# Patient Record
(demographics unavailable — no encounter records)

---

## 2024-10-16 NOTE — ASSESSMENT
[Importance of Diet and Exercise] : importance of diet and exercise to improve glycemic control, achieve weight loss and improve cardiovascular health [Action and use of Insulin] : action and use of short and long-acting insulin [Insulin Self-Administration] : insulin self-administration [FreeTextEntry1] : Diabetes - control is improving, but not at goal. Instructed to register insulin and carbs in Dexcom Pump differences discussed - pt will think if she'd like Omnipod or Mobi. While thinking about which pomp to choose - will change I/C to 1/10 or increase prandial insulin by 2-3 units. Consider GLP-1 - will discuss at the next visit. F/U in 1-2 over the phone to discuss CGM.

## 2024-12-24 NOTE — ASSESSMENT
[Ketone Testing] : ketone testing [Insulin Self-Administration] : insulin self-administration [FreeTextEntry1] : Diabetes - needs new infusion set - True steel; Infusion sites discussed in great detail. DKA - checking and prevention reinforced. Correcting BG with MDI if pump is not delivering insulin explained and reinforced; Rx for ketostix sent. F/U weekly or more often while adjusting to the pump.

## 2025-01-28 NOTE — REASON FOR VISIT
[Follow - Up] : a follow-up visit [Weight Management/Obesity] : weight management/obesity [Other___] : [unfilled] [Insulin Pump] : insulin pump management

## 2025-02-04 NOTE — ASSESSMENT
[Hypoglycemia Management] : hypoglycemia management [Action and use of Insulin] : action and use of short and long-acting insulin [Self Monitoring of Blood Glucose] : self monitoring of blood glucose [FreeTextEntry1] : Diabetes - control is improving - labs ordered to re-check A1C; pt would do it locally. Adjusting carbs for meal bolus - recommendations provided. Would benefit from GLP1/GIP. Will request PA for Mounjaro. Refills and new Rxs sent. F/U in 2-3 mo.

## 2025-02-04 NOTE — ADDENDUM
[FreeTextEntry1] :  [facility] 1/28  4:03PM accepted order for dexcom successfully. #[id number] via Danyel GUALLPA

## 2025-02-04 NOTE — HISTORY OF PRESENT ILLNESS
[FreeTextEntry1] : A 72 yo  came to establish care and for diabetes management Was diagnosed with (DIANN?) about 10 years ago - was treated with oral meds first. Currently on Tresiba - 18 units q am, and Novolog - 2-5 Units. Uses Dexcom. Needs to install Clarity to get connected. [place] about pumps, but is not sure if she wants one. Needs to "learn more about it". POC A1C - 8.4% POC BG - 296 mg/dl Optho - UTD; Podiatry - UTD eats healthy and exercises regularly; PMH - hypothyroidism - 125 mcg; states had US done and would send us a copy of the report. Hyperlipidemia - on low dose of statins. Osteoporosis - no Tx;  on low dose of daily vit d; Last Dexa 4-5 years ago.  10/16/24 MK  This visit was provided via telehealth using real-time 2-way audio visual technology. The patient was located at home at the time of the visit. The provider, BRENDA GUALLPA, was located at the medical office located in [place], [place] at the time of the visit. The patient and Provider participated in the telehealth encounter. Verbal consent given by the patient. F/U after adjustment of insulin. Pt connected via Clarity - reports downloaded and discussed in great detail. Improvement overnight since moving Tresiba to HS (currently on 16 Units) Postprandial hyperglycemia - Novolog is 3-5 Units ac. Is almost ready for pump - has a few questions. Started counting carbs.  12/24/24 MK  This visit was provided via telehealth using real-time 2-way audio visual technology. The patient was located at home at the time of the visit. The provider, BRENDA GUALLPA, was located at the medical office located in [place], [place] at the time of the visit. The patient and Provider participated in the telehealth encounter. Verbal consent given by the patient.  F/U after initiation of Mobi - was trained last week, and the first 3 days BG was WNR. After changing the set - BG is high - reports downloaded and analyzed via Tandem source. Her Basal rate was increased to 0.9/h; BG target - 110; i/c - 1/10; CF - 1;50, IOB - 5 h. TIR -54%; BG ~ 191 mg/dl  1/28/25 MK  This visit was provided via telehealth using real-time 2-way audio visual technology. The patient was located at home at the time of the visit. The provider, BRENDA GUALLPA, was located at the medical office located in [place], [place] at the time of the visit. The patient and Provider participated in the telehealth encounter. Verbal consent given by the patient.  F/U on DM and pump management. Pt feels well, CGM and pump reports reviewed - TIR - 79% with 1% lows and 3% very high. BG average - 149 mg/dl. She increased basal from 9 am to 9 pm to 1.0; needs more insulin for meal boluses. States CGM wakes her up a few times a week for hypoglycemia. Has problems with CGM; changed insurance - needs new refills to various pharmacies. Hypothyroidism - needs refills for t4. Gained weight

## 2025-02-04 NOTE — HISTORY OF PRESENT ILLNESS
[FreeTextEntry1] : A 72 yo  came to establish care and for diabetes management Was diagnosed with (DIANN?) about 10 years ago - was treated with oral meds first. Currently on Tresiba - 18 units q am, and Novolog - 2-5 Units. Uses Dexcom. Needs to install Clarity to get connected. [place] about pumps, but is not sure if she wants one. Needs to "learn more about it". POC A1C - 8.4% POC BG - 296 mg/dl Optho - UTD; Podiatry - UTD eats healthy and exercises regularly; PMH - hypothyroidism - 125 mcg; states had US done and would send us a copy of the report. Hyperlipidemia - on low dose of statins. Osteoporosis - no Tx;  on low dose of daily vit d; Last Dexa 4-5 years ago.  10/16/24 MK  This visit was provided via telehealth using real-time 2-way audio visual technology. The patient was located at home at the time of the visit. The provider, BRENDA GUALLPA, was located at the medical office located in [place], [place] at the time of the visit. The patient and Provider participated in the telehealth encounter. Verbal consent given by the patient. F/U after adjustment of insulin. Pt connected via Clarity - reports downloaded and discussed in great detail. Improvement overnight since moving Tresiba to HS (currently on 16 Units) Postprandial hyperglycemia - Novolog is 3-5 Units ac. Is almost ready for pump - has a few questions. Started counting carbs.  12/24/24 MK  This visit was provided via telehealth using real-time 2-way audio visual technology. The patient was located at home at the time of the visit. The provider, BRENDA GUALLPA, was located at the medical office located in [place], [place] at the time of the visit. The patient and Provider participated in the telehealth encounter. Verbal consent given by the patient.  F/U after initiation of Mobi - was trained last week, and the first 3 days BG was WNR. After changing the set - BG is high - reports downloaded and analyzed via Tandem source. Her Basal rate was increased to 0.9/h; BG target - 110; i/c - 1/10; CF - 1;50, IOB - 5 h. TIR -54%; BG ~ 191 mg/dl  1/28/25 MK  This visit was provided via telehealth using real-time 2-way audio visual technology. The patient was located at home at the time of the visit. The provider, BRENDA GUALLPA, was located at the medical office located in [place], [place] at the time of the visit. The patient and Provider participated in the telehealth encounter. Verbal consent given by the patient.  F/U on DM and pump management. Pt feels well, CGM and pump reports reviewed - TIR - 79% with 1% lows and 3% very high. BG average - 149 mg/dl. She increased basal from 9 am to 9 pm to 1.0; needs more insulin for meal boluses. States CGM wakes her up a few times a week for hypoglycemia. Has problems with CGM; changed insurance - needs new refills to various pharmacies. Hypothyroidism - needs refills for t4. Gained weight

## 2025-02-04 NOTE — ADDENDUM
[FreeTextEntry1] :  [facility] 1/28  4:03PM accepted order for dexcom successfully. #[id number] via Danyel GUALLPA

## 2025-03-31 NOTE — REASON FOR VISIT
[Follow - Up] : a follow-up visit [DM Type 1] : DM Type 1 [Hypothyroidism] : hypothyroidism [Insulin Pump] : insulin pump management

## 2025-03-31 NOTE — HISTORY OF PRESENT ILLNESS
[FreeTextEntry1] : A 74 yo  came to establish care and for diabetes management Was diagnosed with (DIANN?) about 10 years ago - was treated with oral meds first. Currently on Tresiba - 18 units q am, and Novolog - 2-5 Units. Uses Dexcom. Needs to install Clarity to get connected. [place] about pumps, but is not sure if she wants one. Needs to "learn more about it". POC A1C - 8.4% POC BG - 296 mg/dl Optho - UTD; Podiatry - UTD eats healthy and exercises regularly; PMH - hypothyroidism - 125 mcg; states had US done and would send us a copy of the report. Hyperlipidemia - on low dose of statins. Osteoporosis - no Tx;  on low dose of daily vit d; Last Dexa 4-5 years ago.  10/16/24 MK  This visit was provided via telehealth using real-time 2-way audio visual technology. The patient was located at home at the time of the visit. The provider, BRENDA GUALLPA, was located at the medical office located in [place], [place] at the time of the visit. The patient and Provider participated in the telehealth encounter. Verbal consent given by the patient. F/U after adjustment of insulin. Pt connected via Clarity - reports downloaded and discussed in great detail. Improvement overnight since moving Tresiba to HS (currently on 16 Units) Postprandial hyperglycemia - Novolog is 3-5 Units ac. Is almost ready for pump - has a few questions. Started counting carbs.  12/24/24 MK  This visit was provided via telehealth using real-time 2-way audio visual technology. The patient was located at home at the time of the visit. The provider, BRENDA GUALLPA, was located at the medical office located in [place], [place] at the time of the visit. The patient and Provider participated in the telehealth encounter. Verbal consent given by the patient.  F/U after initiation of Mobi - was trained last week, and the first 3 days BG was WNR. After changing the set - BG is high - reports downloaded and analyzed via Tandem source. Her Basal rate was increased to 0.9/h; BG target - 110; i/c - 1/10; CF - 1;50, IOB - 5 h. TIR -54%; BG ~ 191 mg/dl  1/28/25 MK  This visit was provided via telehealth using real-time 2-way audio visual technology. The patient was located at home at the time of the visit. The provider, BRENDA GUALLPA, was located at the medical office located in [place], [place] at the time of the visit. The patient and Provider participated in the telehealth encounter. Verbal consent given by the patient.  F/U on DM and pump management. Pt feels well, CGM and pump reports reviewed - TIR - 79% with 1% lows and 3% very high. BG average - 149 mg/dl. She increased basal from 9 am to 9 pm to 1.0; needs more insulin for meal boluses. States CGM wakes her up a few times a week for hypoglycemia. Has problems with CGM; changed insurance - needs new refills to various pharmacies. Hypothyroidism - needs refills for t4. Gained weight  3/31/25 MK  This visit was provided via telehealth using real-time 2-way audio visual technology. The patient was located at home at the time of the visit. The provider, BRENDA GUALLPA, was located at the medical office located in [place], [place] at the time of the visit. The patient and Provider participated in the telehealth encounter. Verbal consent given by the patient.  F/U on DM. Feels well. Uses MOBI pump - reports downloaded and reviewed with the pt. Pattern of post-prandial hyperglycemia. Pt skips meal boluses, thinking the pump would do correction afterwards - needs reinforcement in basal/bolus and carb counting concepts, as well as Tx and prevention of hypoglycemia. Recent labs discussed - A1C a month ago was 6.5% C/O weight gain - weight today 147 lbs. No other changes or complains.  Hypothyroidism - asymptomatic; TFTs WNR on 2/28/25. Treated with 125 mcg of T4.

## 2025-03-31 NOTE — ASSESSMENT
[Carbohydrate Consistent Diet] : carbohydrate consistent diet [Hypoglycemia Management] : hypoglycemia management [Action and use of Insulin] : action and use of short and long-acting insulin [Insulin Self-Administration] : insulin self-administration [FreeTextEntry1] : Diabetes - control got worse - due to post-prandial hyperglycemia. Instructions on meal bolus reinforced; pt verbalized understanding. Hypothyroidism - continue the same Tx. Pump concepts reinforced. States does not need any refills at this time. F/U in 3 months.

## 2025-04-08 NOTE — ASSESSMENT
[FreeTextEntry1] : 73-year-old female for evaluate status post a fall earlier today.  She reports she tripped and fell onto her right shoulder.  She also twisted her left ankle.  She reports pain in these areas since time of injury.  Physical examination of the right shoulder: Moderate swelling and ecchymosis appreciated throughout.  Generalized tenderness appreciated throughout.  Decreased range of motion at the shoulder.  Sensations intact distally.  Neuro vas intact.  1 out of 5 strength.   Physical examination of left ankle: Mild swelling appreciated laterally greater than medially.  No ecchymosis or erythema appreciated.  Skin is intact.  Patient tender to palpation over the ATFL.  No tenderness over the lateral or medial malleolus.  No tenderness of the deltoid ligament, PT FL, or CFL ligaments.  Stable to varus and valgus stress.  Able to bear weight and ambulate at this time however experiences pain when doing so.  Sensorimotor intact distally.  Neurovascularly intact.  No significant tenderness of the metatarsals or Lisfranc.  Achilles tendon is palpable and intact.  Negative Noland's.  Negative Homans' sign.   X-rays of right shoulder taken the office today reveal an acute closed displaced comminuted fracture at the humeral surgical neck.  No subluxations or dislocations of the shoulder joint are appreciated.  X-rays of the left ankle taken in the office today:  No acute fractures, subluxations, or dislocations.   TX: X-rays were discussed in depth.  She understands she has a displaced fracture.  Operative versus nonoperative management was discussed.  Red flag symptoms were discussed.  Prognosis was discussed.  Patient will remain in her right arm sling at all times at least until surgical consultation.  Red flag symptoms were discussed.  Ibuprofen manage milligrams at patient pharmacy to be taken as needed for pain.  From no contraindication to NSAIDs.  Risks and benefits were discussed. I recommended patient rest, ice, compress, and elevate the regularly. Encouraged activity modification as tolerable. Encouraged gentle range of motion to avoid stiffness. All questions and concerns addressed to patient's satisfaction. Patient expresses full understanding of treatment plan. Patient will follow-up next week for repeat evaluation and treatment/further consultation.

## 2025-04-11 NOTE — IMAGING
[de-identified] : Pleasant middle-aged woman sits comfortably my office.  She accompanied by her  who has multiple questions.  Neither in distress.  Physical examination: Left shoulder: Moderate tenderness at the level of fracture with only mild swelling at the level of the fracture.  No ecchymosis.  No tenderness along acromial arch or clavicle.  No axillary lymph nodes although limited examination.  No tenderness along the epitrochlear region of her humerus.  Unrestricted wrist and digital range of motion with mild dependent edema.  Grossly intact light sensation Menter fingers.  Intact intrinsic hand function.  Radiographs reviewed from April 8, 2025 demonstrate to poor left proximal humerus fracture which is retroverted and varus.

## 2025-04-11 NOTE — ASSESSMENT
[FreeTextEntry1] : 73-year-old woman mildly displaced right proximal humerus shaft left ankle sprain.  We had a long discussion about treatment options.  I reviewed surgical nonsurgical management.  I talked about the risk and benefits of both treatment strategies.  Explained the patient can take a year to maximize recovery.  We talked about when physical therapy started.  Recommend pendulum exercises and elbow range of motion exercises sling for comfort no active range of motion of shoulder ibuprofen with famotidine for pain relief.  Transition off narcotic medication.  Follow-up in 2 weeks for repeat evaluation with repeat radiographs of the patient's right shoulder.  The alternative would be surgical management which I discussed at length.  Patient would like to proceed with nonsurgical management.  All questions answered to their satisfaction.  Note provided for their trip recommending cessation of the chip until she is healed her fracture.  Would probably hold off on prolonged travel for at least 3 months from injury.

## 2025-04-11 NOTE — HISTORY OF PRESENT ILLNESS
[de-identified] : 73-year-old woman referred to my office for management of a right hand dominant 2 part proximal humerus fracture sustained as a result of a ground-level fall on April 8, 2025.  She was seen in our walk-in clinic where radiographs of her humerus demonstrated a displaced right proximal humerus fracture.  Patient was referred to my office.  She remains in the sling.  She is taking 400 mg of ibuprofen 3 times a day in addition to tramadol for pain relief.  She notes the pain is primarily localized to the area of her shoulder but notes that she has pain even when she tries to range her fingers.  She notes some dependent swelling in her hand.  No prior history of shoulder problems or fractures.  No sensory complaints.  Past medical history: Type I diabetic; last A1c 6.5 Hypothyroidism Hypertension Hyperlipidemia  Medications: Afrezza Dexcom Enalapril Ibuprofen 400 mg every 8 as needed Insulin pump Levothyroxine Mounjaro Rosuvastatin  NKDA  Social: Lives with  no cigarette use social EtOH, no drug use

## 2025-04-25 NOTE — ASSESSMENT
[FreeTextEntry1] : 73-year-old woman with proximal humerus fracture now 2 and half weeks from injury.  I think it is not appropriate to have excess ride for her shoulder fracture.  I would not recommend formal physical therapy.  I have reviewed with her some exercises that she can do on her own.  I would continue with her current regimen pain medication and slowly titrate off of the NSAIDs.  I would have her follow-up in my office for 3 weeks time for repeat evaluation radiographs.  All questions were answered to the patient's satisfaction.  Agree with plan.

## 2025-04-25 NOTE — HISTORY OF PRESENT ILLNESS
[de-identified] : 73-year-old woman returns for annual follow-up of a impacted 2 part proximal humerus fracture sustained on April 8, 2025.  The patient continues with discomfort which she is managing with 400 mg 3 times a day and Tylenol.  She is no longer requiring narcotic medication.  She has not really been participating in any of a self-directed exercise program.  No sensory complaints.  She remains in sling.  She has questions about when she can drive when she gets her physical therapy can she have access to right.  She is accompanied by her .

## 2025-04-25 NOTE — IMAGING
[de-identified] : Middle-age woman sits in no distress in my office.  She accompanied by her . Physical examination: Reports tenderness palpation over her shoulder diffusely including the clavicle acromial arch scapula and proximal humerus.  There is no undue swelling.  Ecchymosis resolving.  Normal light sensation extent of distribution.  Able to range digits without difficulty.  Radiographs: Right shoulder (AP, internal Tatian lateral, Y scapular): Impacted 2 part proximal humerus fracture.  Internal rotation lateral radiograph may suggest there is also a fracture of the greater tuberosity but this does not appear to unduly displaced.  There is not significant change in alignment from prior radiographs.

## 2025-05-23 NOTE — IMAGING
[de-identified] : Grecia middle-age woman sits comfortably my office no distress  Physical examination: Right shoulder: Tenderness palpation in the area of the fracture is minimal but she does have tenderness of the deltoid insertion.  Active forward flexion is only about 30 degrees.  Passively we can forward flex to about 60 degrees.  Normal light sensation Axon nerve distribution.  Fires deltoid.  Radiographs: Right shoulder (AP, internal Tatian lateral, Y scapula, external rotation AP.  Imaging demonstrates a lucency around the edges of the distal segment as it is articulating on the undersurface of the humeral head.  Acceptable alignment remains.

## 2025-05-23 NOTE — ASSESSMENT
[FreeTextEntry1] : 73-year-old woman who I fear is going on to her humeral nonunion.  We had to get a CAT scan to look at the fracture.  If there is some healing then we can continue with conservative management.  If there is no healing I am going to recommend surgery to fix her fracture.  I have explained this at length with the patient.  Answered her questions to the best of my ability.  I will see her next week after she gets her CAT scan.  In the meantime sling for comfort.

## 2025-05-23 NOTE — HISTORY OF PRESENT ILLNESS
[de-identified] : 73-year-old woman returns for interval follow-up of the 2 part proximal humerus fracture sustained April 8, 2025.  Patient remains in a sling.  She has been trying to do her exercises.  Patient is no longer requiring pain medication.  Will occasionally take pain medication.  Denies any sensory complaints.

## 2025-05-29 NOTE — ASSESSMENT
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  She is at high risk for eroding her humeral head to the point where it is irreparable.  I think that it is possible to try to fix the fracture with plate and screw fixation.  This does have fairly high risk of failure and may require further surgery.  Alternatively she could have a reverse total shoulder arthroplasty.  I do not think she is going to heal her fracture without surgical intervention.  The patient understands these risks.  I reviewed the CAT scan with her.  She would like to proceed with repair of nonunion.  She is not interested in reverse total shoulder at this time.  Will try to get her on the schedule soon as possible.

## 2025-05-29 NOTE — IMAGING
[de-identified] : Grecia late middle-aged woman looks less than stated age.  Stands comfortably my office in no distress.  Physical examination: Right shoulder: No change.  CT of patient's right proximal humerus and shoulder reviewed from regional radiology.  Agree with radiology report.  There is a pseudoarthrosis.  There is roughly 15 to 10 mm of subchondral bone available primarily anteriorly for repair.  Posteriorly subchondral bone has almost completely eroded.

## 2025-05-29 NOTE — HISTORY OF PRESENT ILLNESS
[de-identified] : 73-year-old woman returns to this office for evaluation of right proximal humerus fracture.  Seen last week radiographs are highly suggestive of pseudoarthrosis.  She was sent for a CAT scan and returns today to review the findings and discuss treatment options.  Continues with some discomfort in the area of her shoulder.

## 2025-07-01 NOTE — ASSESSMENT
[FreeTextEntry1] : 73-year-old woman status post repair of right proximal humeral nonunion.  Patient can start pendulum exercises and elbow range of motion send in 1 week start active assisted forward flexion exercises.  Ibuprofen for pain relief.  Will have her follow-up in my office in 3 to 4 weeks time for repeat evaluation radiographs of her right shoulder.  She develops any pain or problems am happy to see her back sooner.  All questions were answered to her satisfaction and she agrees with the plan.  I have instructed him on active assisted forward flexion exercises.

## 2025-07-01 NOTE — IMAGING
[de-identified] : Pleasant middle-age woman sits comfortably in my office no distress.  Physical examination: Right shoulder: Surgical incision is clean dry intact no erythema ration or fluctuance.  Normal light touch axillary nerve distribution.  Active assisted forward flexion 90 degrees.  No Axsain lymph nodes.  Unrestricted elbow range of motion.  Radiographs: Right shoulder (AP, lateral): Fracture alignment well-maintained.  Hardware remains in place.  No evidence of loosening.  Improvement in alignment of shoulder from prior to surgery.

## 2025-07-01 NOTE — HISTORY OF PRESENT ILLNESS
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  Patient returns for first postoperative visit noting her pain is well-controlled.  Denies any fevers or drainage.  Denies any sensory complaints.

## 2025-07-21 NOTE — HISTORY OF PRESENT ILLNESS
[FreeTextEntry1] : A 74 yo  came to establish care and for diabetes management Was diagnosed with (DIANN?) about 10 years ago - was treated with oral meds first. Currently on Tresiba - 18 units q am, and Novolog - 2-5 Units. Uses Dexcom. Needs to install Clarity to get connected. [place] about pumps, but is not sure if she wants one. Needs to "learn more about it". POC A1C - 8.4% POC BG - 296 mg/dl Optho - UTD; Podiatry - UTD eats healthy and exercises regularly; PMH - hypothyroidism - 125 mcg; states had US done and would send us a copy of the report. Hyperlipidemia - on low dose of statins. Osteoporosis - no Tx;  on low dose of daily vit d; Last Dexa 4-5 years ago.  10/16/24 MK  This visit was provided via telehealth using real-time 2-way audio visual technology. The patient was located at home at the time of the visit. The provider, BRENDA GUALLPA, was located at the medical office located in [place], [place] at the time of the visit. The patient and Provider participated in the telehealth encounter. Verbal consent given by the patient. F/U after adjustment of insulin. Pt connected via Clarity - reports downloaded and discussed in great detail. Improvement overnight since moving Tresiba to HS (currently on 16 Units) Postprandial hyperglycemia - Novolog is 3-5 Units ac. Is almost ready for pump - has a few questions. Started counting carbs.  12/24/24 MK  This visit was provided via telehealth using real-time 2-way audio visual technology. The patient was located at home at the time of the visit. The provider, BRENDA GUALLPA, was located at the medical office located in [place], [place] at the time of the visit. The patient and Provider participated in the telehealth encounter. Verbal consent given by the patient.  F/U after initiation of Mobi - was trained last week, and the first 3 days BG was WNR. After changing the set - BG is high - reports downloaded and analyzed via [employer] source. Her Basal rate was increased to 0.9/h; BG target - 110; i/c - 1/10; CF - 1;50, IOB - 5 h. TIR -54%; BG ~ 191 mg/dl  1/28/25 MK  This visit was provided via telehealth using real-time 2-way audio visual technology. The patient was located at home at the time of the visit. The provider, BRENDA GUALLPA, was located at the medical office located in [place], [place] at the time of the visit. The patient and Provider participated in the telehealth encounter. Verbal consent given by the patient.  F/U on DM and pump management. Pt feels well, CGM and pump reports reviewed - TIR - 79% with 1% lows and 3% very high. BG average - 149 mg/dl. She increased basal from 9 am to 9 pm to 1.0; needs more insulin for meal boluses. States CGM wakes her up a few times a week for hypoglycemia. Has problems with CGM; changed insurance - needs new refills to various pharmacies. Hypothyroidism - needs refills for t4. Gained weight  3/31/25 MK  This visit was provided via telehealth using real-time 2-way audio visual technology. The patient was located at home at the time of the visit. The provider, BRENDA GUALLPA, was located at the medical office located in [place], [place] at the time of the visit. The patient and Provider participated in the telehealth encounter. Verbal consent given by the patient.  F/U on DM. Feels well. Uses MOBI pump - reports downloaded and reviewed with the pt. Pattern of post-prandial hyperglycemia. Pt skips meal boluses, thinking the pump would do correction afterwards - needs reinforcement in basal/bolus and carb counting concepts, as well as Tx and prevention of hypoglycemia. Recent labs discussed - A1C a month ago was 6.5% C/O weight gain - weight today 147 lbs. No other changes or complains.  Hypothyroidism - asymptomatic; TFTs WNR on 2/28/25. Treated with 125 mcg of T4.  7/21/25 MK  This visit was provided via telehealth using real-time 2-way audio visual technology. The patient was located at home at the time of the visit. The provider, BRENDA GUALLPA, was located at the medical office located in [place], [place] at the time of the visit. The patient and Provider participated in the telehealth encounter. Verbal consent given by the patient. F/U on DM and pump management. Feels fine. States BG is WNR - pump records reviewed. No hypoglycemia. Sent recent report from outside lab - A1C is 5.8% - copy to scan. Osteoporosis managed by PCP - pt has questions about prolia. Did not start Tx yet. Needs to re-send [facility] pt assistance program documents. Needs refills. optho and podiatry -UTD. Had shoulder surgery - is doing PT now.

## 2025-07-21 NOTE — ASSESSMENT
[Diabetes Foot Care] : diabetes foot care [Hypoglycemia Management] : hypoglycemia management [Self Monitoring of Blood Glucose] : self monitoring of blood glucose [Retinopathy Screening] : Patient was referred to ophthalmology for retinopathy screening [FreeTextEntry1] : Diabetes - control at goal, continue same Tx. Ozempic - will re-send the order form [facility] - information provided - action, SE discussed Refills sent F/U in 3 months